# Patient Record
Sex: MALE | Race: WHITE | NOT HISPANIC OR LATINO | Employment: FULL TIME | URBAN - METROPOLITAN AREA
[De-identification: names, ages, dates, MRNs, and addresses within clinical notes are randomized per-mention and may not be internally consistent; named-entity substitution may affect disease eponyms.]

---

## 2017-12-12 ENCOUNTER — HOSPITAL ENCOUNTER (OUTPATIENT)
Facility: HOSPITAL | Age: 57
Discharge: HOME | End: 2017-12-13
Attending: FAMILY MEDICINE | Admitting: STUDENT IN AN ORGANIZED HEALTH CARE EDUCATION/TRAINING PROGRAM
Payer: COMMERCIAL

## 2017-12-12 DIAGNOSIS — N13.2 HYDRONEPHROSIS WITH URINARY OBSTRUCTION DUE TO URETERAL CALCULUS: ICD-10-CM

## 2017-12-12 DIAGNOSIS — R11.10 VOMITING, INTRACTABILITY OF VOMITING NOT SPECIFIED, PRESENCE OF NAUSEA NOT SPECIFIED, UNSPECIFIED VOMITING TYPE: ICD-10-CM

## 2017-12-12 DIAGNOSIS — I71.43 ANEURYSM OF INFRARENAL ABDOMINAL AORTA: ICD-10-CM

## 2017-12-12 DIAGNOSIS — N13.30 HYDRONEPHROSIS, RIGHT: Primary | ICD-10-CM

## 2017-12-12 DIAGNOSIS — N28.9 RENAL INSUFFICIENCY: ICD-10-CM

## 2017-12-12 LAB
ALBUMIN SERPL BCP-MCNC: 4.3 G/DL
ALP SERPL-CCNC: 91 U/L
ALT SERPL W/O P-5'-P-CCNC: 45 U/L
ANION GAP SERPL CALC-SCNC: 10 MMOL/L
AST SERPL-CCNC: 38 U/L
BASOPHILS # BLD AUTO: 0.03 K/UL
BASOPHILS NFR BLD: 0.2 %
BILIRUB SERPL-MCNC: 0.7 MG/DL
BILIRUB UR QL STRIP: NEGATIVE
BUN SERPL-MCNC: 20 MG/DL
CALCIUM SERPL-MCNC: 9 MG/DL
CHLORIDE SERPL-SCNC: 106 MMOL/L
CLARITY UR REFRACT.AUTO: ABNORMAL
CO2 SERPL-SCNC: 24 MMOL/L
COLOR UR AUTO: YELLOW
CREAT SERPL-MCNC: 1.57 MG/DL
DIFFERENTIAL METHOD: ABNORMAL
EOSINOPHIL # BLD AUTO: 0.1 K/UL
EOSINOPHIL NFR BLD: 0.3 %
ERYTHROCYTE [DISTWIDTH] IN BLOOD BY AUTOMATED COUNT: 12.9 %
EST. GFR  (AFRICAN AMERICAN): 56.1 ML/MIN/1.73 M^2
EST. GFR  (NON AFRICAN AMERICAN): 48.6 ML/MIN/1.73 M^2
GLUCOSE SERPL-MCNC: 109 MG/DL
GLUCOSE UR QL STRIP: NEGATIVE
HCT VFR BLD AUTO: 49.5 %
HGB BLD-MCNC: 16.7 G/DL
HGB UR QL STRIP: ABNORMAL
KETONES UR QL STRIP: ABNORMAL
LEUKOCYTE ESTERASE UR QL STRIP: ABNORMAL
LYMPHOCYTES # BLD AUTO: 1.7 K/UL
LYMPHOCYTES NFR BLD: 10.8 %
MCH RBC QN AUTO: 29.6 PG
MCHC RBC AUTO-ENTMCNC: 33.7 G/DL
MCV RBC AUTO: 88 FL
MICROSCOPIC COMMENT: ABNORMAL
MONOCYTES # BLD AUTO: 1.5 K/UL
MONOCYTES NFR BLD: 9.6 %
NEUTROPHILS # BLD AUTO: 12.6 K/UL
NEUTROPHILS NFR BLD: 78.9 %
NITRITE UR QL STRIP: NEGATIVE
PH UR STRIP: 5 [PH] (ref 5–8)
PLATELET # BLD AUTO: 208 K/UL
PMV BLD AUTO: 10.6 FL
POTASSIUM SERPL-SCNC: 4.1 MMOL/L
PROT SERPL-MCNC: 7.6 G/DL
PROT UR QL STRIP: ABNORMAL
RBC # BLD AUTO: 5.65 M/UL
RBC #/AREA URNS AUTO: 80 /HPF (ref 0–4)
SODIUM SERPL-SCNC: 140 MMOL/L
SP GR UR STRIP: 1.02 (ref 1–1.03)
URN SPEC COLLECT METH UR: ABNORMAL
UROBILINOGEN UR STRIP-ACNC: NEGATIVE EU/DL
WBC # BLD AUTO: 15.92 K/UL
WBC #/AREA URNS AUTO: 4 /HPF (ref 0–5)

## 2017-12-12 PROCEDURE — 25000003 PHARM REV CODE 250: Performed by: EMERGENCY MEDICINE

## 2017-12-12 PROCEDURE — 96365 THER/PROPH/DIAG IV INF INIT: CPT

## 2017-12-12 PROCEDURE — 96361 HYDRATE IV INFUSION ADD-ON: CPT

## 2017-12-12 PROCEDURE — 25000003 PHARM REV CODE 250: Performed by: STUDENT IN AN ORGANIZED HEALTH CARE EDUCATION/TRAINING PROGRAM

## 2017-12-12 PROCEDURE — 99285 EMERGENCY DEPT VISIT HI MDM: CPT | Mod: 25

## 2017-12-12 PROCEDURE — 80053 COMPREHEN METABOLIC PANEL: CPT

## 2017-12-12 PROCEDURE — 63600175 PHARM REV CODE 636 W HCPCS: Performed by: EMERGENCY MEDICINE

## 2017-12-12 PROCEDURE — G0378 HOSPITAL OBSERVATION PER HR: HCPCS

## 2017-12-12 PROCEDURE — 96375 TX/PRO/DX INJ NEW DRUG ADDON: CPT

## 2017-12-12 PROCEDURE — 63600175 PHARM REV CODE 636 W HCPCS: Performed by: FAMILY MEDICINE

## 2017-12-12 PROCEDURE — 63600175 PHARM REV CODE 636 W HCPCS: Performed by: STUDENT IN AN ORGANIZED HEALTH CARE EDUCATION/TRAINING PROGRAM

## 2017-12-12 PROCEDURE — 25000003 PHARM REV CODE 250: Performed by: FAMILY MEDICINE

## 2017-12-12 PROCEDURE — 81000 URINALYSIS NONAUTO W/SCOPE: CPT

## 2017-12-12 PROCEDURE — 94760 N-INVAS EAR/PLS OXIMETRY 1: CPT

## 2017-12-12 PROCEDURE — 85025 COMPLETE CBC W/AUTO DIFF WBC: CPT

## 2017-12-12 PROCEDURE — 87086 URINE CULTURE/COLONY COUNT: CPT

## 2017-12-12 RX ORDER — SODIUM CHLORIDE 9 MG/ML
1000 INJECTION, SOLUTION INTRAVENOUS
Status: COMPLETED | OUTPATIENT
Start: 2017-12-12 | End: 2017-12-12

## 2017-12-12 RX ORDER — SODIUM CHLORIDE, SODIUM LACTATE, POTASSIUM CHLORIDE, CALCIUM CHLORIDE 600; 310; 30; 20 MG/100ML; MG/100ML; MG/100ML; MG/100ML
INJECTION, SOLUTION INTRAVENOUS CONTINUOUS
Status: DISCONTINUED | OUTPATIENT
Start: 2017-12-12 | End: 2017-12-13 | Stop reason: HOSPADM

## 2017-12-12 RX ORDER — MORPHINE SULFATE 2 MG/ML
2 INJECTION, SOLUTION INTRAMUSCULAR; INTRAVENOUS EVERY 4 HOURS PRN
Status: DISCONTINUED | OUTPATIENT
Start: 2017-12-12 | End: 2017-12-13 | Stop reason: HOSPADM

## 2017-12-12 RX ORDER — CIPROFLOXACIN 2 MG/ML
400 INJECTION, SOLUTION INTRAVENOUS
Status: DISCONTINUED | OUTPATIENT
Start: 2017-12-12 | End: 2017-12-13 | Stop reason: HOSPADM

## 2017-12-12 RX ORDER — ONDANSETRON 2 MG/ML
8 INJECTION INTRAMUSCULAR; INTRAVENOUS
Status: COMPLETED | OUTPATIENT
Start: 2017-12-12 | End: 2017-12-12

## 2017-12-12 RX ORDER — ONDANSETRON 2 MG/ML
4 INJECTION INTRAMUSCULAR; INTRAVENOUS EVERY 8 HOURS PRN
Status: DISCONTINUED | OUTPATIENT
Start: 2017-12-12 | End: 2017-12-12

## 2017-12-12 RX ORDER — KETOROLAC TROMETHAMINE 30 MG/ML
30 INJECTION, SOLUTION INTRAMUSCULAR; INTRAVENOUS
Status: COMPLETED | OUTPATIENT
Start: 2017-12-12 | End: 2017-12-12

## 2017-12-12 RX ORDER — ONDANSETRON 2 MG/ML
4 INJECTION INTRAMUSCULAR; INTRAVENOUS EVERY 6 HOURS PRN
Status: DISCONTINUED | OUTPATIENT
Start: 2017-12-12 | End: 2017-12-13 | Stop reason: HOSPADM

## 2017-12-12 RX ADMIN — KETOROLAC TROMETHAMINE 30 MG: 30 INJECTION, SOLUTION INTRAMUSCULAR at 04:12

## 2017-12-12 RX ADMIN — SODIUM CHLORIDE 1000 ML: 0.9 INJECTION, SOLUTION INTRAVENOUS at 05:12

## 2017-12-12 RX ADMIN — SODIUM CHLORIDE, SODIUM LACTATE, POTASSIUM CHLORIDE, AND CALCIUM CHLORIDE: 600; 310; 30; 20 INJECTION, SOLUTION INTRAVENOUS at 10:12

## 2017-12-12 RX ADMIN — SODIUM CHLORIDE 1000 ML: 0.9 INJECTION, SOLUTION INTRAVENOUS at 04:12

## 2017-12-12 RX ADMIN — ONDANSETRON 4 MG: 2 INJECTION INTRAMUSCULAR; INTRAVENOUS at 10:12

## 2017-12-12 RX ADMIN — PROMETHAZINE HYDROCHLORIDE 25 MG: 25 INJECTION INTRAMUSCULAR; INTRAVENOUS at 07:12

## 2017-12-12 RX ADMIN — MORPHINE SULFATE 2 MG: 2 INJECTION, SOLUTION INTRAMUSCULAR; INTRAVENOUS at 10:12

## 2017-12-12 RX ADMIN — CIPROFLOXACIN 400 MG: 2 INJECTION, SOLUTION INTRAVENOUS at 10:12

## 2017-12-12 RX ADMIN — SODIUM CHLORIDE, SODIUM LACTATE, POTASSIUM CHLORIDE, AND CALCIUM CHLORIDE: 600; 310; 30; 20 INJECTION, SOLUTION INTRAVENOUS at 07:12

## 2017-12-12 RX ADMIN — ONDANSETRON 8 MG: 2 INJECTION, SOLUTION INTRAMUSCULAR; INTRAVENOUS at 05:12

## 2017-12-12 NOTE — ED PROVIDER NOTES
Encounter Date: 12/12/2017       History     Chief Complaint   Patient presents with    Flank Pain     Pt states started with right flank pain last pm, states took oral ketorolac this am with + relief.  States + nausea, denies blood in urine.  Pt states has history of kidney stones.      57-year-old male presents with chief complaint of right flank pain.  Patient reports the pain started early this a.m. and describes 2 out of 10 pain.  Does have an extensive history of kidney stones and did take a dose of Toradol 10 mg by mouth this morning.  Since this a.m. has continued to have multiple episodes of vomiting states now continues to dry heave.  Patient denies any fever or chills but was advised presents emergency room as the symptoms were not improving.  States has not having pain emesis continues to have this excessive nausea and vomiting.          Review of patient's allergies indicates:   Allergen Reactions    Iodine and iodide containing products Hives     Past Medical History:   Diagnosis Date    Kidney stones      Past Surgical History:   Procedure Laterality Date    EYE SURGERY      LITHOTRIPSY      WRIST SURGERY Left      Family History   Problem Relation Age of Onset    No Known Problems Mother     No Known Problems Father      Social History   Substance Use Topics    Smoking status: Current Every Day Smoker     Types: Cigarettes    Smokeless tobacco: Never Used    Alcohol use No     Review of Systems   Constitutional: Negative for chills and fever.   Gastrointestinal: Positive for abdominal pain, nausea and vomiting.   Genitourinary: Positive for flank pain.   All other systems reviewed and are negative.      Physical Exam     Initial Vitals [12/12/17 1611]   BP Pulse Resp Temp SpO2   (!) 142/83 71 20 97.5 °F (36.4 °C) 98 %      MAP       102.67         Physical Exam    Nursing note and vitals reviewed.  Constitutional: He appears well-developed and well-nourished.   HENT:   Head: Normocephalic  and atraumatic.   Eyes: Conjunctivae and EOM are normal. Pupils are equal, round, and reactive to light.   Neck: Normal range of motion. Neck supple.   Cardiovascular: Normal rate, regular rhythm and normal heart sounds.   Pulmonary/Chest: Breath sounds normal.   Abdominal: Soft. Bowel sounds are normal. There is tenderness. There is no rebound.   Musculoskeletal: Normal range of motion.   Neurological: He is alert and oriented to person, place, and time. He has normal reflexes.   Skin: Skin is warm. Capillary refill takes less than 2 seconds.   Psychiatric: He has a normal mood and affect. His behavior is normal.         ED Course   Procedures  Labs Reviewed   URINALYSIS - Abnormal; Notable for the following:        Result Value    Appearance, UA Hazy (*)     Protein, UA Trace (*)     Ketones, UA 3+ (*)     Occult Blood UA 3+ (*)     Leukocytes, UA 1+ (*)     All other components within normal limits   CBC W/ AUTO DIFFERENTIAL - Abnormal; Notable for the following:     WBC 15.92 (*)     Gran # 12.6 (*)     Mono # 1.5 (*)     Gran% 78.9 (*)     Lymph% 10.8 (*)     All other components within normal limits   COMPREHENSIVE METABOLIC PANEL - Abnormal; Notable for the following:     Creatinine 1.57 (*)     ALT 45 (*)     eGFR if  56.1 (*)     eGFR if non  48.6 (*)     All other components within normal limits   URINALYSIS MICROSCOPIC - Abnormal; Notable for the following:     RBC, UA 80 (*)     All other components within normal limits                 Imaging Results          CT Renal Stone Study ABD Pelvis WO (Final result)     Abnormal  Result time 12/12/17 16:53:10    Final result by Rahul Farrell MD (12/12/17 16:53:10)                 Impression:       4 mm stone is seen within the distal right ureter at the pelvic inlet with moderate right-sided hydronephrosis and perinephric stranding.    3.4 cm infrarenal abdominal aortic aneurysm.    All CT scans at this facility use dose  modulation, iterative reconstruction and/or weight based dosing when appropriate to reduce radiation dose to as low as reasonably achievable.      Electronically signed by: LAISHA RAINEY MD  Date:     12/12/17  Time:    16:53              Narrative:    Indication: Flank pain.    Routine noncontrast CT images of the abdomen and pelvis were obtained.    Findings:    The lung bases are well aerated.  Heart size is normal.  No pericardial or pleural effusion.      The liver is normal in size and attenuation on this noncontrast exam. Multiple hypodensities are seen within the liver, largest within segment 4B of the left hepatic lobe measuring 3 cm consistent with a cyst. The gallbladder, stomach, spleen, pancreas, adrenal glands are normal.  Aorta demonstrates moderate atherosclerotic disease.  3.4 cm infrarenal abdominal aortic aneurysm.    4 mm stone is seen within the distal right ureter at the pelvic inlet with moderate right-sided hydronephrosis and perinephric stranding. Punctate stone is also noted within the lower pole calyx. Left kidney is normal in size without evidence of ureteral calculus.. The bladder is incompletely distended.  Prostate is enlarged measuring 5.6 cm.    The visualized loops of small bowel are unremarkable.The appendix is visualized in the right lower quadrant.  There is no inflammation. Colon demonstrates diverticulosis without evidence of diverticulitis..      Bones appear intact .                                   5:10 PM discussed with Dr. Tony Hampton the patient's urologist who concurs with admission to the hospital but states cannot admitted to [facility] as no 24 anesthesia coverage.  Discussed with Dr. Mayuri Peacock who agrees to admit the patient to hospital for IV hydration.        ED Course      Clinical Impression:   The primary encounter diagnosis was Hydronephrosis, right. Diagnoses of Renal insufficiency and Vomiting, intractability of vomiting not specified, presence of  nausea not specified, unspecified vomiting type were also pertinent to this visit.                           Mariusz Thomas MD  12/12/17 4945

## 2017-12-13 VITALS
TEMPERATURE: 97 F | BODY MASS INDEX: 29.62 KG/M2 | OXYGEN SATURATION: 98 % | DIASTOLIC BLOOD PRESSURE: 86 MMHG | SYSTOLIC BLOOD PRESSURE: 155 MMHG | HEART RATE: 66 BPM | HEIGHT: 69 IN | RESPIRATION RATE: 18 BRPM | WEIGHT: 200 LBS

## 2017-12-13 PROBLEM — N13.2 HYDRONEPHROSIS WITH URINARY OBSTRUCTION DUE TO URETERAL CALCULUS: Status: ACTIVE | Noted: 2017-12-13

## 2017-12-13 LAB
ANION GAP SERPL CALC-SCNC: 8 MMOL/L
BASOPHILS # BLD AUTO: 0.03 K/UL
BASOPHILS NFR BLD: 0.3 %
BUN SERPL-MCNC: 14 MG/DL
CALCIUM SERPL-MCNC: 8.6 MG/DL
CHLORIDE SERPL-SCNC: 106 MMOL/L
CO2 SERPL-SCNC: 25 MMOL/L
CREAT SERPL-MCNC: 1.1 MG/DL
DIFFERENTIAL METHOD: ABNORMAL
EOSINOPHIL # BLD AUTO: 0.1 K/UL
EOSINOPHIL NFR BLD: 0.5 %
ERYTHROCYTE [DISTWIDTH] IN BLOOD BY AUTOMATED COUNT: 12.8 %
EST. GFR  (AFRICAN AMERICAN): >60 ML/MIN/1.73 M^2
EST. GFR  (NON AFRICAN AMERICAN): >60 ML/MIN/1.73 M^2
GLUCOSE SERPL-MCNC: 115 MG/DL
HCT VFR BLD AUTO: 45.5 %
HGB BLD-MCNC: 15.1 G/DL
LYMPHOCYTES # BLD AUTO: 1.7 K/UL
LYMPHOCYTES NFR BLD: 15.3 %
MCH RBC QN AUTO: 29.8 PG
MCHC RBC AUTO-ENTMCNC: 33.2 G/DL
MCV RBC AUTO: 90 FL
MONOCYTES # BLD AUTO: 1.1 K/UL
MONOCYTES NFR BLD: 9.9 %
NEUTROPHILS # BLD AUTO: 8.2 K/UL
NEUTROPHILS NFR BLD: 73.7 %
PLATELET # BLD AUTO: 187 K/UL
PMV BLD AUTO: 10.9 FL
POTASSIUM SERPL-SCNC: 4.3 MMOL/L
RBC # BLD AUTO: 5.07 M/UL
SODIUM SERPL-SCNC: 139 MMOL/L
WBC # BLD AUTO: 11.06 K/UL

## 2017-12-13 PROCEDURE — 94761 N-INVAS EAR/PLS OXIMETRY MLT: CPT

## 2017-12-13 PROCEDURE — 80048 BASIC METABOLIC PNL TOTAL CA: CPT

## 2017-12-13 PROCEDURE — 36415 COLL VENOUS BLD VENIPUNCTURE: CPT

## 2017-12-13 PROCEDURE — 99219 PR INITIAL OBSERVATION CARE,LEVL II: CPT | Mod: ,,, | Performed by: STUDENT IN AN ORGANIZED HEALTH CARE EDUCATION/TRAINING PROGRAM

## 2017-12-13 PROCEDURE — G0378 HOSPITAL OBSERVATION PER HR: HCPCS

## 2017-12-13 PROCEDURE — 85025 COMPLETE CBC W/AUTO DIFF WBC: CPT

## 2017-12-13 PROCEDURE — 25000003 PHARM REV CODE 250: Performed by: STUDENT IN AN ORGANIZED HEALTH CARE EDUCATION/TRAINING PROGRAM

## 2017-12-13 RX ORDER — ONDANSETRON 8 MG/1
8 TABLET, ORALLY DISINTEGRATING ORAL EVERY 6 HOURS PRN
Qty: 30 TABLET | Refills: 0 | Status: SHIPPED | OUTPATIENT
Start: 2017-12-13

## 2017-12-13 RX ORDER — KETOROLAC TROMETHAMINE 10 MG/1
10 TABLET, FILM COATED ORAL EVERY 6 HOURS PRN
Qty: 30 TABLET | Refills: 0 | Status: SHIPPED | OUTPATIENT
Start: 2017-12-13

## 2017-12-13 RX ORDER — TAMSULOSIN HYDROCHLORIDE 0.4 MG/1
0.4 CAPSULE ORAL DAILY
Qty: 30 CAPSULE | Refills: 1 | Status: SHIPPED | OUTPATIENT
Start: 2017-12-13 | End: 2018-12-13

## 2017-12-13 RX ORDER — OXYCODONE AND ACETAMINOPHEN 5; 325 MG/1; MG/1
1 TABLET ORAL EVERY 6 HOURS PRN
Qty: 30 TABLET | Refills: 0 | Status: SHIPPED | OUTPATIENT
Start: 2017-12-13

## 2017-12-13 RX ADMIN — SODIUM CHLORIDE, SODIUM LACTATE, POTASSIUM CHLORIDE, AND CALCIUM CHLORIDE: 600; 310; 30; 20 INJECTION, SOLUTION INTRAVENOUS at 06:12

## 2017-12-13 NOTE — PROGRESS NOTES
Dr ferro notified of patients arrival to room. I notified Md that patient is hungry. Per MD patient to remain NPO. New orders received for pain medication

## 2017-12-13 NOTE — PLAN OF CARE
Patient left before assessment completed.     12/13/17 1346   Discharge Assessment   Assessment Type Discharge Planning Assessment   Assessment information obtained from? Medical Record   Prior to hospitilization cognitive status: Alert/Oriented   Prior to hospitalization functional status: Independent   Current cognitive status: Alert/Oriented   Current Functional Status: Independent   Facility Arrived From: Home   Discharge Plan A Home with family   Discharge Plan B Home with family;Home Health   Patient/Family In Agreement With Plan unable to assess     Jenae Clancy RN  Transition Navigator  [phone]

## 2017-12-13 NOTE — PLAN OF CARE
Problem: Patient Care Overview  Goal: Plan of Care Review  Outcome: Ongoing (interventions implemented as appropriate)  patient AAOx4. Ambulatory. Last bm on 12/12. Urinating without difficulty. Urine yellow. Orders to strain all urine being followed. No sediment or stones noted as of now. patient voiding in urinal. Urine culture sent to lab as ordered. IV fluids infusing to PIV as ordered. receiving IV Cipro  As ordered. Patient remained NPO as ordered per Dr ferro because patient had been vomiting frequently at [facility], Per MD. Patient with complaints of pain and nausea overnight. Medicated with IV morphine and IV zofran with stated relief. Safety ensured. Call light within reach. Will continue to monitor

## 2017-12-13 NOTE — DISCHARGE SUMMARY
[facility]  Urology  Discharge Summary      Patient Name: Ke Dang  MRN: [id number]  Admission Date: 12/12/2017  Hospital Length of Stay: 0 days  Discharge Date: 12/13/2017  Attending Physician: Mayuri Peacock MD   Discharging Provider: Mayuri Peacock MD  Primary Care Physician: No primary care provider on file.    HPI: The patient is a 57 y.o. male      right flank pain since Tuesday which worsened last night and he presented to the [facility] Er. He was having dry heaves but no vomiting. He notes this greatly improved after IV zofran. The ER physician requested transfer for observation. He has had a history of kidney stones in the past some of them required surgeries (ureteroscopy, ureteral stent, and ESWL).  He denies fevers, chills.     He was admitted for pain control, IV hydration, and monitoring of his nausea.     * No surgery found *     Hospital Course (synopsis of major diagnoses, care, treatment, and services provided during the course of the hospital stay): overnight he tolerated Iv fluids well, his nausea improved, his pain improved, and his AM labs revealed his WBC count was stable as well as his serum Cr. After discussing with the patient he elected to try medical expulsive therapy for his stone and I counseled he has a high likelihood of stone passage.     Consults: None    Significant Diagnostic Studies: CT    Pending Diagnostic Studies:     None          Final Active Diagnoses:    Diagnosis Date Noted POA    PRINCIPAL PROBLEM:  Hydronephrosis with urinary obstruction due to ureteral calculus [N13.2] 12/13/2017 Unknown    Hydronephrosis, right [N13.30] 12/12/2017 Yes      Problems Resolved During this Admission:    Diagnosis Date Noted Date Resolved POA       Discharged Condition: good    Disposition: Home or Self Care    Follow Up: he will call his established urologist Dr. Tony Hampton for followup      Patient Instructions:     Diet general     Activity as tolerated     Call  MD for:  temperature >100.4     Call MD for:  persistent nausea and vomiting or diarrhea     No dressing needed   Scheduling Instructions: Provide strainer for patient to strain urine for kidney stones         Medications:  Reconciled Home Medications:   Current Discharge Medication List      START taking these medications    Details   ondansetron (ZOFRAN-ODT) 8 MG TbDL Take 1 tablet (8 mg total) by mouth every 6 (six) hours as needed (nausea).  Qty: 30 tablet, Refills: 0    Associated Diagnoses: Hydronephrosis, right; Renal insufficiency; Hydronephrosis with urinary obstruction due to ureteral calculus      oxyCODONE-acetaminophen (PERCOCET) 5-325 mg per tablet Take 1 tablet by mouth every 6 (six) hours as needed for Pain.  Qty: 30 tablet, Refills: 0    Associated Diagnoses: Hydronephrosis, right; Renal insufficiency; Hydronephrosis with urinary obstruction due to ureteral calculus      tamsulosin (FLOMAX) 0.4 mg Cp24 Take 1 capsule (0.4 mg total) by mouth once daily.  Qty: 30 capsule, Refills: 1    Associated Diagnoses: Hydronephrosis, right; Renal insufficiency; Hydronephrosis with urinary obstruction due to ureteral calculus         CONTINUE these medications which have CHANGED    Details   ketorolac (TORADOL) 10 mg tablet Take 1 tablet (10 mg total) by mouth every 6 (six) hours as needed for Pain.  Qty: 30 tablet, Refills: 0    Associated Diagnoses: Hydronephrosis, right; Renal insufficiency; Hydronephrosis with urinary obstruction due to ureteral calculus             Time spent on the discharge of patient: 20 minutes    Mayuri Peacock MD  Urology  [facility]

## 2017-12-13 NOTE — PLAN OF CARE
TN called patient at home, so see if he would like TN to schedule follow-up appointment. He stated he will call his regular urologist to schedule a follow-up. He thanked  for help provided.    Follow-up With  Details  Why  Contact Info   Tony Hampton MD  Schedule an appointment as soon as possible for a visit  Patient will schedule his own appointment with his Urologist.  [address] [id number]  [phone]      12/13/17 0923   Final Note   Assessment Type Final Discharge Note   Discharge Disposition Home   What phone number can be called within the next 1-3 days to see how you are doing after discharge? [phone]   Hospital Follow Up  Appt(s) scheduled? Yes   Discharge plans and expectations educations in teach back method with documentation complete? Yes   Right Care Referral Info   Post Acute Recommendation No Care     Jenae Clancy RN  Transition Navigator  [phone]

## 2017-12-13 NOTE — PROGRESS NOTES
Patient Arrived to room via EMS. AAOx4. Denies pain at this time. Patient ambulatory. Oriented to call bell and room

## 2017-12-13 NOTE — H&P
[facility]  Urology  History and Physical  Patient Name: Ke Dang  MRN: [id number]  Admission Date: 12/12/2017  Hospital Length of Stay: 0 days  Code Status: Full Code   Attending Provider: Mayuri Peacock MD   Consulting Provider: Mayuri Peacock MD  Primary Care Physician: No primary care provider on file.  Principal Problem:Hydronephrosis with urinary obstruction due to ureteral calculus    Consults    Subjective:     HPI: 57 y.o. male with right flank pain since Tuesday which worsened last night and he presented to the [facility] Er. He was having dry heaves but no vomiting. He notes this greatly improved after IV zofran. The ER physician requested transfer for observation. He has had a history of kidney stones in the past some of them required surgeries (ureteroscopy, ureteral stent, and ESWL).  He denies fevers, chills.     Past Medical History:   Diagnosis Date    Kidney stones        Past Surgical History:   Procedure Laterality Date    EYE SURGERY      LITHOTRIPSY      WRIST SURGERY Left        Review of patient's allergies indicates:   Allergen Reactions    Iodine and iodide containing products Hives       Family History     Problem Relation (Age of Onset)    No Known Problems Mother, Father          Social History Main Topics    Smoking status: Current Every Day Smoker     Types: Cigarettes    Smokeless tobacco: Never Used    Alcohol use No    Drug use: No    Sexual activity: Not on file       Review of Systems   Constitutional: Negative for activity change.   HENT: Negative for facial swelling.    Eyes: Negative for visual disturbance.   Respiratory: Negative for chest tightness.    Cardiovascular: Negative for chest pain.   Gastrointestinal: Negative for abdominal distention.   Musculoskeletal: Negative for gait problem.   Skin: Negative for color change.   Neurological: Negative for dizziness.   Hematological: Negative for adenopathy.   Psychiatric/Behavioral: Negative for  agitation.       Objective:     Temp:  [97.1 °F (36.2 °C)-99 °F (37.2 °C)] 97.1 °F (36.2 °C)  Pulse:  [59-71] 66  Resp:  [18-20] 18  SpO2:  [96 %-100 %] 98 %  BP: (136-157)/(71-96) 155/86     Body mass index is 29.53 kg/m².            Lines/Drains/Airways     Peripheral Intravenous Line                 Peripheral IV - Single Lumen 12/12/17 1630 Right Antecubital less than 1 day                Physical Exam   Vitals reviewed.  Constitutional: He is oriented to person, place, and time. He appears well-developed and well-nourished.   HENT:   Head: Normocephalic and atraumatic.   Eyes: Conjunctivae are normal. Pupils are equal, round, and reactive to light.   Neck: Normal range of motion. Neck supple.   Cardiovascular: Intact distal pulses.    Pulmonary/Chest: Effort normal. No respiratory distress.   Abdominal: Soft. He exhibits no distension. There is no tenderness.   Musculoskeletal: Normal range of motion. He exhibits no edema.   Neurological: He is alert and oriented to person, place, and time.   Skin: Skin is warm and dry.     Psychiatric: He has a normal mood and affect. His behavior is normal.       Significant Labs:  BMP:    Recent Labs  Lab 12/12/17  1632 12/13/17  0509    139   K 4.1 4.3    106   CO2 24 25   BUN 20 14   CREATININE 1.57* 1.1   CALCIUM 9.0 8.6*       CBC:    Recent Labs  Lab 12/12/17  1632 12/13/17  0509   WBC 15.92* 11.06   HGB 16.7 15.1   HCT 49.5 45.5    187       CMP:   Recent Labs  Lab 12/12/17  1632 12/13/17  0509    115*    139   K 4.1 4.3    106   CO2 24 25   BUN 20 14   CREATININE 1.57* 1.1   CALCIUM 9.0 8.6*     Urine Culture: No results for input(s): LABURIN in the last 168 hours.  Urine Studies:   Recent Labs  Lab 12/12/17  1617   COLORU Yellow   APPEARANCEUA Hazy*   PHUR 5.0   SPECGRAV 1.025   PROTEINUA Trace*   GLUCUA Negative   KETONESU 3+*   BILIRUBINUA Negative   OCCULTUA 3+*   NITRITE Negative   UROBILINOGEN Negative   LEUKOCYTESUR 1+*    RBCUA 80*   WBCUA 4     All pertinent labs results from the past 24 hours have been reviewed.  Recent Lab Results       12/13/17  0509 12/12/17  1632 12/12/17  1617      Albumin  4.3      Alkaline Phosphatase  91      ALT  45(H)      Anion Gap 8 10      Appearance, UA   Hazy(A)     AST  38      Baso # 0.03 0.03      Basophil% 0.3 0.2      Bilirubin (UA)   Negative     Total Bilirubin  0.7  Comment:  For infants and newborns, interpretation of results should be based  on gestational age, weight and in agreement with clinical  observations.  Premature Infant recommended reference ranges:  Up to 24 hours.............<8.0 mg/dL  Up to 48 hours............<12.0 mg/dL  3-5 days..................<15.0 mg/dL  6-29 days.................<15.0 mg/dL        BUN, Bld 14 20      Calcium 8.6(L) 9.0      Chloride 106 106      CO2 25 24      Color, UA   Yellow     Creatinine 1.1 1.57(H)      Differential Method Automated Automated      eGFR if  >60 56.1(A)      eGFR if non  >60  Comment:  Calculation used to obtain the estimated glomerular filtration  rate (eGFR) is the CKD-EPI equation.    48.6  Comment:  Calculation used to obtain the estimated glomerular filtration  rate (eGFR) is the CKD-EPI equation.   (A)      Eos # 0.1 0.1      Eosinophil% 0.5 0.3      Glucose 115(H) 109      Glucose, UA   Negative     Gran # 8.2(H) 12.6(H)      Gran% 73.7(H) 78.9(H)      Hematocrit 45.5 49.5      Hemoglobin 15.1 16.7      Ketones, UA   3+(A)     Leukocytes, UA   1+(A)     Lymph # 1.7 1.7      Lymph% 15.3(L) 10.8(L)      MCH 29.8 29.6      MCHC 33.2 33.7      MCV 90 88      Microscopic Comment   SEE COMMENT  Comment:  Other formed elements not mentioned in the report are not   present in the microscopic examination.        Mono # 1.1(H) 1.5(H)      Mono% 9.9 9.6      MPV 10.9 10.6      Nitrite, UA   Negative     Occult Blood UA   3+(A)     pH, UA   5.0     Platelets 187 208      Potassium 4.3 4.1      Total  Protein  7.6      Protein, UA   Trace  Comment:  Recommend a 24 hour urine protein or a urine   protein/creatinine ratio if globulin induced proteinuria is  clinically suspected.  (A)     RBC 5.07 5.65      RBC, UA   80(H)     RDW 12.8 12.9      Sodium 139 140      Specific Gravity, UA   1.025     Specimen UA   Urine, Clean Catch     Urobilinogen, UA   Negative     WBC, UA   4     WBC 11.06 15.92(H)            Significant Imaging:  CT: I have reviewed all results within the past 24 hours and my personal findings are:  right mid ureteral calculus 3-4mm, hydroureternephrosis down to the level of the stone. No other calculi identified.     Assessment and Plan:     1. Right ureteral calculus and hydronephrosis - the patient has a high likelihood of spontaneous passage with fluid intake and flomax. Will admit to observation for nausea and vomiting.  2. IVF at 150cc/hour.  3. NPO in case we need to proceed with a right ureteral stent overnight.  4. Cipro Iv.  5. Pain controlled with toradol.   6. Will reassess in the morning - stent versus discharge.     Active Diagnoses:    Diagnosis Date Noted POA    PRINCIPAL PROBLEM:  Hydronephrosis with urinary obstruction due to ureteral calculus [N13.2] 12/13/2017 Unknown    Hydronephrosis, right [N13.30] 12/12/2017 Yes      Problems Resolved During this Admission:    Diagnosis Date Noted Date Resolved POA       VTE Risk Mitigation         Ordered     Medium Risk of VTE  Once      12/12/17 1756     Place sequential compression device  Until discontinued      12/12/17 1756            Mayuri Peacock MD  Urology  [facility]

## 2017-12-14 LAB — BACTERIA UR CULT: NO GROWTH

## 2018-08-01 DIAGNOSIS — I71.40 AAA (ABDOMINAL AORTIC ANEURYSM): Primary | ICD-10-CM

## 2018-08-07 ENCOUNTER — HOSPITAL ENCOUNTER (OUTPATIENT)
Dept: RADIOLOGY | Facility: HOSPITAL | Age: 58
Discharge: HOME | End: 2018-08-07
Attending: FAMILY MEDICINE
Payer: COMMERCIAL

## 2018-08-07 DIAGNOSIS — I71.40 AAA (ABDOMINAL AORTIC ANEURYSM): ICD-10-CM

## 2018-08-07 PROCEDURE — 74176 CT ABD & PELVIS W/O CONTRAST: CPT | Mod: TC,PO

## 2018-10-11 ENCOUNTER — HOSPITAL ENCOUNTER (EMERGENCY)
Facility: HOSPITAL | Age: 58
Discharge: HOME | End: 2018-10-11
Attending: EMERGENCY MEDICINE
Payer: COMMERCIAL

## 2018-10-11 VITALS
BODY MASS INDEX: 28.88 KG/M2 | DIASTOLIC BLOOD PRESSURE: 87 MMHG | WEIGHT: 195 LBS | HEART RATE: 105 BPM | SYSTOLIC BLOOD PRESSURE: 140 MMHG | TEMPERATURE: 98 F | HEIGHT: 69 IN | RESPIRATION RATE: 20 BRPM | OXYGEN SATURATION: 97 %

## 2018-10-11 DIAGNOSIS — M79.661 TENDERNESS OF RIGHT CALF: Primary | ICD-10-CM

## 2018-10-11 DIAGNOSIS — M79.669 CALF TENDERNESS: ICD-10-CM

## 2018-10-11 PROCEDURE — 99284 EMERGENCY DEPT VISIT MOD MDM: CPT | Mod: 25

## 2018-10-11 RX ORDER — TAMSULOSIN HYDROCHLORIDE 0.4 MG/1
0.4 CAPSULE ORAL DAILY
COMMUNITY

## 2018-10-11 RX ORDER — SULFAMETHOXAZOLE AND TRIMETHOPRIM 800; 160 MG/1; MG/1
1 TABLET ORAL
COMMUNITY

## 2018-10-11 RX ORDER — HYDROCODONE BITARTRATE AND ACETAMINOPHEN 5; 325 MG/1; MG/1
1 TABLET ORAL EVERY 6 HOURS PRN
Qty: 15 TABLET | Refills: 0 | Status: SHIPPED | OUTPATIENT
Start: 2018-10-11

## 2018-10-11 RX ORDER — PHENAZOPYRIDINE HYDROCHLORIDE 200 MG/1
200 TABLET, FILM COATED ORAL 3 TIMES DAILY PRN
COMMUNITY

## 2018-10-11 NOTE — ED PROVIDER NOTES
Encounter Date: 10/11/2018       History     Chief Complaint   Patient presents with    Leg Swelling     Pt states had laser procedure on prostate 6 days ago, states is having pain and swelling to right calf muscle.       Patient is a 58 year old male who presents with right calf pain for two days. He reports no PMH. He states he had prostate surgery done two days ago. He states he stayed in the hospital over night and then released. He states he developed progressively worsening right calf tenderness with associated swelling. He denied any redness or warmth. He denied any previous history of blood clots. He is not currently on any blood thinners. He denied any injury.      The history is provided by the patient.     Review of patient's allergies indicates:   Allergen Reactions    Iodine and iodide containing products Hives     Past Medical History:   Diagnosis Date    Enlarged prostate     Kidney stones      Past Surgical History:   Procedure Laterality Date    EYE SURGERY      LITHOTRIPSY      WRIST SURGERY Left      Family History   Problem Relation Age of Onset    No Known Problems Mother     No Known Problems Father      Social History     Tobacco Use    Smoking status: Current Every Day Smoker     Types: Cigarettes    Smokeless tobacco: Never Used   Substance Use Topics    Alcohol use: No    Drug use: No     Review of Systems   Constitutional: Negative for activity change, appetite change, chills and fever.   HENT: Negative for congestion, rhinorrhea and sore throat.    Eyes: Negative for redness and visual disturbance.   Respiratory: Negative for cough, chest tightness and shortness of breath.    Cardiovascular: Positive for leg swelling. Negative for chest pain and palpitations.   Gastrointestinal: Negative for abdominal pain, diarrhea, nausea and vomiting.   Genitourinary: Negative for dysuria and frequency.   Musculoskeletal: Negative for back pain, neck pain and neck stiffness.   Skin:  Negative for rash.   Neurological: Negative for dizziness, syncope, numbness and headaches.       Physical Exam     Initial Vitals [10/11/18 1341]   BP Pulse Resp Temp SpO2   (!) 140/87 105 20 97.8 °F (36.6 °C) 97 %      MAP       --         Physical Exam    Constitutional: He appears well-developed and well-nourished. He is cooperative.  Non-toxic appearance. He does not have a sickly appearance.   HENT:   Head: Normocephalic and atraumatic.   Right Ear: External ear normal.   Left Ear: External ear normal.   Nose: Nose normal.   Mouth/Throat: Oropharynx is clear and moist.   Eyes: Conjunctivae and lids are normal. Pupils are equal, round, and reactive to light.   Neck: Normal range of motion and full passive range of motion without pain. Neck supple.   Cardiovascular: Normal rate, regular rhythm and normal heart sounds. Exam reveals no gallop and no friction rub.    No murmur heard.  Pulmonary/Chest: Breath sounds normal. He has no wheezes. He has no rhonchi. He has no rales.   Abdominal: Normal appearance. There is no rigidity.   Musculoskeletal:        Right lower leg: He exhibits tenderness and swelling. He exhibits no bony tenderness.   Tenderness to the right calf with associated swelling. Positive homans sign. 2+ pedal pulse. Sensation intact. Cap refill < 3 seconds.    Neurological: He is alert and oriented to person, place, and time.   Skin: Skin is warm, dry and intact. No rash noted.         ED Course   Procedures  Labs Reviewed - No data to display       Imaging Results          US Lower Extremity Veins Right (Final result)  Result time 10/11/18 15:02:04    Final result by Rahul Farrell MD (10/11/18 15:02:04)                 Impression:      No evidence of deep venous thrombosis in the right lower extremity.      Electronically signed by: Rahul Farrell MD  Date:    10/11/2018  Time:    15:02             Narrative:    EXAMINATION:  US LOWER EXTREMITY VEINS RIGHT    CLINICAL HISTORY:  Pain in  unspecified lower leg    TECHNIQUE:  Duplex and color flow Doppler evaluation and graded compression of the right lower extremity veins was performed.    COMPARISON:  None    FINDINGS:  Right thigh veins: The common femoral, femoral, popliteal, upper greater saphenous, and deep femoral veins are patent and free of thrombus. The veins are normally compressible and have normal phasic flow and augmentation response.    Right calf veins: The visualized calf veins are patent.    Contralateral CFV: The contralateral (left) common femoral vein is patent and free of thrombus.    Miscellaneous: None                                 Medical Decision Making:   Initial Assessment:   Patient is a 58 year old male who presents with right calf pain for two days. He reports no PMH. He states he had prostate surgery done two days ago. He states he stayed in the hospital over night and then released. He states he developed progressively worsening right calf tenderness with associated swelling. He denied any redness or warmth. He denied any previous history of blood clots. He is not currently on any blood thinners. He denied any injury.  ED Management:  Urgent evaluation of a 58-year-old male who presents with right calf pain and swelling.  Recent prostate procedure.  He has tenderness to palpation. Positive Homans sign.  Ultrasound shows no evidence of deep vein thrombosis.  He has some superficial thrombosis noted to the posterior calf.  He is neurovascularly intact. No skin breaks concerning for cellulitis.  Pain control and close follow-up with primary care.                      Clinical Impression:   The primary encounter diagnosis was Tenderness of right calf. A diagnosis of Calf tenderness was also pertinent to this visit.                             Merced Byrnes PA-C  10/11/18 9801

## 2019-12-02 NOTE — PROGRESS NOTES
Order faxed, patient notified.   Pt AAOx4. VSS. NAD noted. Discharge teaching given. Pt verbalized understanding. All questions answered. Pt d/c'd per protocol.